# Patient Record
Sex: MALE | Race: WHITE | Employment: FULL TIME | URBAN - METROPOLITAN AREA
[De-identification: names, ages, dates, MRNs, and addresses within clinical notes are randomized per-mention and may not be internally consistent; named-entity substitution may affect disease eponyms.]

---

## 2022-10-28 ENCOUNTER — HOSPITAL ENCOUNTER (EMERGENCY)
Age: 42
Discharge: HOME | End: 2022-10-28
Attending: STUDENT IN AN ORGANIZED HEALTH CARE EDUCATION/TRAINING PROGRAM
Payer: COMMERCIAL

## 2022-10-28 ENCOUNTER — APPOINTMENT (OUTPATIENT)
Dept: GENERAL RADIOLOGY | Age: 42
End: 2022-10-28
Payer: COMMERCIAL

## 2022-10-28 VITALS
RESPIRATION RATE: 20 BRPM | HEART RATE: 110 BPM | TEMPERATURE: 97.3 F | SYSTOLIC BLOOD PRESSURE: 153 MMHG | BODY MASS INDEX: 42.66 KG/M2 | HEIGHT: 72 IN | OXYGEN SATURATION: 96 % | WEIGHT: 315 LBS | DIASTOLIC BLOOD PRESSURE: 101 MMHG

## 2022-10-28 DIAGNOSIS — S90.32XA CONTUSION OF LEFT FOOT, INITIAL ENCOUNTER: Primary | ICD-10-CM

## 2022-10-28 PROCEDURE — 73630 X-RAY EXAM OF FOOT: CPT

## 2022-10-28 PROCEDURE — 99283 EMERGENCY DEPT VISIT LOW MDM: CPT

## 2022-10-28 ASSESSMENT — PAIN SCALES - GENERAL: PAINLEVEL_OUTOF10: 4

## 2022-10-28 ASSESSMENT — PAIN DESCRIPTION - PAIN TYPE: TYPE: ACUTE PAIN

## 2022-10-28 ASSESSMENT — PAIN DESCRIPTION - ONSET: ONSET: SUDDEN

## 2022-10-28 ASSESSMENT — PAIN - FUNCTIONAL ASSESSMENT: PAIN_FUNCTIONAL_ASSESSMENT: 0-10

## 2022-10-28 ASSESSMENT — PAIN DESCRIPTION - LOCATION: LOCATION: FOOT

## 2022-10-28 ASSESSMENT — PAIN DESCRIPTION - FREQUENCY: FREQUENCY: INTERMITTENT

## 2022-10-28 ASSESSMENT — PAIN DESCRIPTION - DESCRIPTORS: DESCRIPTORS: DULL

## 2022-10-28 ASSESSMENT — PAIN DESCRIPTION - ORIENTATION: ORIENTATION: LEFT

## 2022-10-28 NOTE — DISCHARGE INSTRUCTIONS
Follow-up with primary care in the next 1 week   Follow-up with orthopedics per referral  Take OTC medication as needed for pain

## 2022-10-28 NOTE — ED NOTES
Family member came to nurse's station to ask when physician would be in. I explained he was in another patient's room and would be in as soon as possible.       Flavia Danielson RN  10/28/22 [id number]

## 2022-10-28 NOTE — ED NOTES
Ankle brace applied to left foot/ankle. Patient discharged with no new rx. Patient was able to ambulate independently with crutches. Patient verbalized understanding of discharge instructions and follow up care.       Flavia Danielson RN  10/28/22 2870

## 2022-10-28 NOTE — ED PROVIDER NOTES
Emergency Department Encounter    Patient: Lacey Canada  MRN: [id number]  : 1980  Date of Evaluation: 10/28/2022  ED Provider:  Andreea Dominguez DO    Triage Chief Complaint:   Foot Injury    [place]:  Lacey Canada is a 40 y.o. male presenting to the ED with a history of left foot injury. 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 Patient said he felt a pop in the midfoot of his left foot. Patient presents to the ED for evaluation. ROS - see HPI, below listed is current ROS at time of my eval:  General:  No fevers, no chills, no weakness  Eyes:  No recent vison changes, no discharge  ENT:  No sore throat, no nasal congestion, no hearing changes  Cardiovascular:  No chest pain, no palpitations  Respiratory:  No shortness of breath, no cough, no wheezing  Gastrointestinal:  No pain, no nausea, no vomiting, no diarrhea  Musculoskeletal: Left foot pain  Skin:  No rash, no pruritis, no easy bruising  Neurologic:  No speech problems, no headache, no extremity numbness, no extremity tingling, no extremity weakness  Psychiatric:  No anxiety  Genitourinary:  No dysuria, no hematuria  Endocrine:  No unexpected weight gain, no unexpected weight loss  Extremities:  no edema, no pain    History reviewed. No pertinent past medical history. History reviewed. No pertinent surgical history. History reviewed. No pertinent family history.   Social History     Socioeconomic History    Marital status:      Spouse name: Not on file    Number of children: Not on file    Years of education: Not on file    Highest education level: Not on file   Occupational History    Not on file   Tobacco Use    Smoking status: Never    Smokeless tobacco: Never   Substance and Sexual Activity    Alcohol use: Never    Drug use: Never    Sexual activity: Not on file   Other Topics Concern    Not on file   Social History Narrative    Not on file     Social Determinants of Health     Financial Resource Strain: Not on file Food Insecurity: Not on file   Transportation Needs: Not on file   Physical Activity: Not on file   Stress: Not on file   Social Connections: Not on file   Intimate Partner Violence: Not on file   Housing Stability: Not on file     No current facility-administered medications for this encounter. No current outpatient medications on file. No Known Allergies    Nursing Notes Reviewed    Physical Exam:  Triage VS:    ED Triage Vitals   Enc Vitals Group      BP 10/28/22 1422 (S) (!) 153/101      Heart Rate 10/28/22 1419 (!) 110      Resp 10/28/22 1419 20      Temp 10/28/22 1422 97.3 °F (36.3 °C)      Temp Source 10/28/22 1422 Temporal      SpO2 10/28/22 1419 96 %      Weight 10/28/22 1419 (!) 385 lb (174.6 kg)      Height 10/28/22 1419 6' (1.829 m)      Head Circumference --       Peak Flow --       Pain Score --       Pain Loc --       Pain Edu? --       Excl. in [address]? --        My pulse ox interpretation is - normal    General appearance:  No acute distress. Skin:  Warm. Dry. Eye:  Extraocular movements intact. Ears, nose, mouth and throat:  Oral mucosa moist   Neck:  Trachea midline. Extremity: Tenderness to the left foot. Mild swelling. No erythema, warmth, or fluctuance, and no obvious deformities. Distal pulses intact. Heart:  Regular rate and rhythm, normal S1 & S2, no extra heart sounds. Perfusion:  intact  Respiratory:  Lungs clear to auscultation bilaterally. Respirations nonlabored. Abdominal:  Normal bowel sounds. Soft. Nontender. Non distended. Back:  No CVA tenderness to palpation     Neurological:  Alert and oriented times 3. No focal neuro deficits. Psychiatric:  Appropriate    I have reviewed and interpreted all of the currently available lab results from this visit (if applicable):  No results found for this visit on 10/28/22.    Radiographs (if obtained):  Radiologist's Report Reviewed:  XR FOOT LEFT (MIN 3 VIEWS)   Final Result   No acute fracture EKG (if obtained): (All EKG's are interpreted by myself in the absence of a cardiologist)      MDM:  52-year-old male presenting to the ED with a history of left foot injury. 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 Patient said he felt a pop in the midfoot of his left foot. Patient presents to the ED for evaluation. Physical examination significant for mildly swollen and tender left foot. Distal pulses intact. X-ray is unremarkable for any acute osseous injury. Is patient declined pain medication at this time. Patient is discharged with primary care and orthopedic follow-up referral.  Patient also given a brace and crutches on discharge. Clinical Impression:  1. Contusion of left foot, initial encounter      Disposition referral (if applicable):  Brisa Shahid MD  [address] [place] [address]  [phone]    Schedule an appointment as soon as possible for a visit in 1 week      [address]    Schedule an appointment as soon as possible for a visit in 1 week    Disposition medications (if applicable):  New Prescriptions    No medications on file     ED Provider Disposition Time  DISPOSITION Decision To Discharge 10/28/2022 04:12:56 PM      Comment: Please note this report has been produced using speech recognition software and may contain errors related to that system including errors in grammar, punctuation, and spelling, as well as words and phrases that may be inappropriate. Efforts were made to edit the dictations.         [address] [facility],[address], DO  10/28/22 1939

## 2022-10-28 NOTE — Clinical Note
Laila Lacy was seen and treated in our emergency department on 10/28/2022. He may return to work on 11/04/2022. Patient was seen in the ER today. He may return to work in 1 week. If you have any questions or concerns, please don't hesitate to call.       [address] [facility],[address], DO

## 2022-10-28 NOTE — ED TRIAGE NOTES
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 Did feel a \"pop\". Is able to bear weight but it is painful. Patient denies any numbness or tingling with it.